# Patient Record
(demographics unavailable — no encounter records)

---

## 2025-04-03 NOTE — HISTORY OF PRESENT ILLNESS
[FreeTextEntry1] : 54-year-old diabetic man with chronic constipation.  He denies rectal bleeding, melena or hematemesis.  He takes Senokot and docusate with relief.  However recently he has noticed increased straining.  He also complains of abdominal pain, bloating and has had approximately a 10 pound weight loss over the past 6 months.  His last colonoscopy was over 10 years ago and the results are not available.  Patient also complains of acid reflux for which she takes over-the-counter medication.  He also underwent an EGD over 10 years ago and the results are not available.

## 2025-04-03 NOTE — ASSESSMENT
[FreeTextEntry1] : Abdominal pain, tenderness anorexia and weight loss rule out acute diverticulitis rule out colonic malignancy. Dietary and lifestyle modification discussed with the patient.  Patient is referred for a CT of the abdomen and pelvis. Colon cancer screening.  The importance of screening colonoscopy and the colonoscopy prep was discussed with the patient.  He understands and all questions were answered.  He will proceed with the colonoscopy pending the results of the CT of the abdomen. GERD. Dietary and lifestyle modification discussed with the patient.  Will schedule EGD and biopsy. Today's care provided as part of continued longitudinal relationship of patient's ongoing need for healthcare services

## 2025-04-03 NOTE — PHYSICAL EXAM
[Alert] : alert [Normal Voice/Communication] : normal voice/communication [Healthy Appearing] : healthy appearing [No Acute Distress] : no acute distress [Sclera] : the sclera and conjunctiva were normal [Hearing Threshold Finger Rub Not [place]] : hearing was normal [Normal Lips/Gums] : the lips and gums were normal [Oropharynx] : the oropharynx was normal [Normal Appearance] : the appearance of the neck was normal [No Neck Mass] : no neck mass was observed [No Respiratory Distress] : no respiratory distress [No Acc Muscle Use] : no accessory muscle use [Respiration, Rhythm And Depth] : normal respiratory rhythm and effort [Auscultation Breath Sounds / Voice Sounds] : lungs were clear to auscultation bilaterally [Heart Rate And Rhythm] : heart rate was normal and rhythm regular [Normal S1, S2] : normal S1 and S2 [Murmurs] : no murmurs [None] : no edema [Bowel Sounds] : normal bowel sounds [Abdomen Tenderness] : non-tender [No Masses] : no abdominal mass palpated [Abdomen Soft] : soft [] : no hepatosplenomegaly [Axillary Lymph Nodes Enlarged Bilaterally] : no axillary lymphadenopathy [No CVA Tenderness] : no CVA  tenderness [Abnormal Walk] : normal gait [Motor Exam] : the motor exam was normal [Normal] : oriented to person, place, and time [Oriented To Time, Place, And Person] : oriented to person, place, and time [Normal Affect] : the affect was normal [Normal Mood] : the mood was normal

## 2025-04-03 NOTE — REVIEW OF SYSTEMS
[Recent Weight Loss (___ Lbs)] : recent [unfilled] ~Ulb weight loss [As Noted in HPI] : as noted in HPI [Abdominal Pain] : abdominal pain [Constipation] : constipation [Heartburn] : heartburn [Bloating (gassiness)] : bloating [Negative] : Heme/Lymph [Vomiting] : no vomiting [Diarrhea] : no diarrhea [Melena (black stool)] : no melena [Bleeding] : no bleeding [Fecal Incontinence (soiling)] : no fecal incontinence

## 2025-05-06 NOTE — ASSESSMENT
[FreeTextEntry1] : Irritable bowel syndrome with constipation. Dietary and lifestyle modification discussed with the patient.  Continue stool softeners and fiber supplement.  Patient scheduled for a screening colonoscopy and EGD.  This was discussed with the patient.  He understands and all questions were answered. Today's care provided as part of continued longitudinal relationship of patient's ongoing need for healthcare services. DIFFICULTY URINATING

## 2025-05-06 NOTE — HISTORY OF PRESENT ILLNESS
[FreeTextEntry1] : 54-year-old man with chronic constipation.  His constipation is improved with stool softener laxative however he complains of post bowel movement abdominal gas and bloating.  He underwent a CT of the abdomen and pelvis that was unremarkable.  He is scheduled for a screening colonoscopy and EGD and biopsy on April 26.

## 2025-07-10 NOTE — HISTORY OF PRESENT ILLNESS
[FreeTextEntry1] : 54-year-old diabetic man with a history of H. pylori.  He was treated with amoxicillin clarithromycin and omeprazole.  He underwent a EGD and biopsy on 625 that showed eradication of the H. pylori.  Screening colonoscopy performed at the same time was incomplete due to a colonic loop.  The cecum was not reached or evaluated.  He denies rectal bleeding, melena or hematemesis.

## 2025-07-10 NOTE — ASSESSMENT
[FreeTextEntry1] : Screening colonoscopy.  Incomplete colonoscopy.  Virtual colonoscopy ordered.  This was discussed with the patient.  He understands and all questions were answered. GERD with treated H. pylori infection. Dietary and lifestyle modification discussed with the patient. D/C Omeprazole.  Famotidine as needed.